# Patient Record
Sex: FEMALE | Race: WHITE | URBAN - METROPOLITAN AREA
[De-identification: names, ages, dates, MRNs, and addresses within clinical notes are randomized per-mention and may not be internally consistent; named-entity substitution may affect disease eponyms.]

---

## 2018-06-06 ENCOUNTER — OFFICE VISIT (OUTPATIENT)
Dept: URBAN - METROPOLITAN AREA CLINIC 193 | Facility: CLINIC | Age: 63
End: 2018-06-06

## 2018-06-06 DIAGNOSIS — Z98.890 OTHER SPECIFIED POSTPROCEDURAL STATES: ICD-10-CM

## 2018-06-06 DIAGNOSIS — H33.011 RETINAL DETACHMENT WITH SINGLE BREAK, RIGHT EYE: ICD-10-CM

## 2018-06-06 DIAGNOSIS — Z96.1 PRESENCE OF INTRAOCULAR LENS: ICD-10-CM

## 2018-06-06 DIAGNOSIS — H25.11 AGE-RELATED NUCLEAR CATARACT, RIGHT EYE: ICD-10-CM

## 2018-06-06 DIAGNOSIS — H40.1132 PRIMARY OPEN-ANGLE GLAUCOMA, BILATERAL, MODERATE STAGE: Primary | ICD-10-CM

## 2018-06-06 PROCEDURE — 92014 COMPRE OPH EXAM EST PT 1/>: CPT | Performed by: OPHTHALMOLOGY

## 2018-06-06 PROCEDURE — 92133 CPTRZD OPH DX IMG PST SGM ON: CPT | Performed by: OPHTHALMOLOGY

## 2018-06-06 RX ORDER — TRAVOPROST 0.04 MG/ML
0.004 % SOLUTION/ DROPS OPHTHALMIC
Qty: 7.5 | Refills: 1 | Status: INACTIVE
Start: 2018-06-06 | End: 2018-11-05

## 2018-06-06 RX ORDER — TIMOLOL MALEATE 5 MG/ML
0.5 % SOLUTION/ DROPS OPHTHALMIC
Qty: 1 | Refills: 5 | Status: ACTIVE
Start: 2018-06-06

## 2018-06-06 ASSESSMENT — INTRAOCULAR PRESSURE
OS: 13
OD: 14

## 2018-06-06 NOTE — IMPRESSION/PLAN
Impression: Diagnosis: Other specified postprocedural states. Code: Z98.890.  S/P Lasik OU Plan: Continue to monitor

## 2018-06-06 NOTE — IMPRESSION/PLAN
Impression: Diagnosis: Retinal detachment with single break, right eye. Code: H33.011.  S/P Scleral Buckle Plan: Continue to monitor

## 2018-06-06 NOTE — IMPRESSION/PLAN
Impression: Diagnosis: Primary open-angle glaucoma, bilateral, moderate stage. Code: Y36.1672. IOP OU controlled on current regimen. No changes needed. OCT stable compared to last.  VF OD  ? showing mild change, overall stable, VF OS stable
need to recheck diurnal variation. Plan: Continue to monitor for now. Continue Travatan QHS OU Timolol BID OD only for now.  recommend repeating VF in 6 months 12/2018

## 2018-06-06 NOTE — IMPRESSION/PLAN
Impression: Diagnosis: Presence of intraocular lens. Code: Z96.1. Side: OS.  Plan: Continue to monitor

## 2018-06-06 NOTE — IMPRESSION/PLAN
Impression: Diagnosis: Age-related nuclear cataract, right eye. Code: H25.11. Side: OD.  Plan: Continue to monitor